# Patient Record
Sex: FEMALE | Race: OTHER | Employment: STUDENT | URBAN - METROPOLITAN AREA
[De-identification: names, ages, dates, MRNs, and addresses within clinical notes are randomized per-mention and may not be internally consistent; named-entity substitution may affect disease eponyms.]

---

## 2021-11-28 ENCOUNTER — HOSPITAL ENCOUNTER (EMERGENCY)
Facility: HOSPITAL | Age: 21
Discharge: NURSING, REHAB OR LONG-TERM CARE | End: 2021-11-29
Attending: EMERGENCY MEDICINE
Payer: MEDICAID

## 2021-11-28 DIAGNOSIS — R45.851 SUICIDAL IDEATION: ICD-10-CM

## 2021-11-28 DIAGNOSIS — F32.A DEPRESSION, UNSPECIFIED DEPRESSION TYPE: Primary | ICD-10-CM

## 2021-11-29 VITALS
HEART RATE: 85 BPM | TEMPERATURE: 99 F | BODY MASS INDEX: 29.99 KG/M2 | DIASTOLIC BLOOD PRESSURE: 80 MMHG | WEIGHT: 180 LBS | RESPIRATION RATE: 18 BRPM | HEIGHT: 65 IN | SYSTOLIC BLOOD PRESSURE: 125 MMHG | OXYGEN SATURATION: 100 %

## 2021-11-29 PROCEDURE — 90792 PSYCH DIAG EVAL W/MED SRVCS: CPT | Performed by: OTHER

## 2021-11-29 RX ORDER — IBUPROFEN 600 MG/1
600 TABLET ORAL EVERY 6 HOURS PRN
Status: DISCONTINUED | OUTPATIENT
Start: 2021-11-29 | End: 2021-11-29

## 2021-11-29 RX ORDER — LORAZEPAM 2 MG/ML
1 INJECTION INTRAMUSCULAR ONCE
Status: COMPLETED | OUTPATIENT
Start: 2021-11-29 | End: 2021-11-29

## 2021-11-29 RX ORDER — DIAZEPAM 5 MG/1
5 TABLET ORAL ONCE
Status: COMPLETED | OUTPATIENT
Start: 2021-11-29 | End: 2021-11-29

## 2021-11-29 RX ORDER — LORAZEPAM 1 MG/1
1 TABLET ORAL ONCE
Status: COMPLETED | OUTPATIENT
Start: 2021-11-29 | End: 2021-11-29

## 2021-11-29 RX ORDER — PAROXETINE 10 MG/1
10 TABLET, FILM COATED ORAL DAILY
Status: DISCONTINUED | OUTPATIENT
Start: 2021-11-29 | End: 2021-11-29

## 2021-11-29 RX ORDER — ZIPRASIDONE HYDROCHLORIDE 20 MG/1
20 CAPSULE ORAL EVERY MORNING
Status: DISCONTINUED | OUTPATIENT
Start: 2021-11-29 | End: 2021-11-29

## 2021-11-29 RX ORDER — FLUOXETINE 10 MG/1
10 TABLET, FILM COATED ORAL DAILY
Status: DISCONTINUED | OUTPATIENT
Start: 2021-11-29 | End: 2021-11-29

## 2021-11-29 RX ORDER — ZIPRASIDONE HYDROCHLORIDE 40 MG/1
40 CAPSULE ORAL EVERY EVENING
Status: DISCONTINUED | OUTPATIENT
Start: 2021-11-29 | End: 2021-11-29

## 2021-11-29 RX ORDER — DIAZEPAM 2 MG/1
2 TABLET ORAL EVERY 6 HOURS PRN
Status: DISCONTINUED | OUTPATIENT
Start: 2021-11-29 | End: 2021-11-29

## 2021-11-29 RX ORDER — LORAZEPAM 2 MG/ML
2 INJECTION INTRAMUSCULAR ONCE
Status: COMPLETED | OUTPATIENT
Start: 2021-11-29 | End: 2021-11-29

## 2021-11-29 NOTE — ED NOTES
Pt accepted to [address] [place] [address] under Dr. [place].  Arrange transport to arrive for pickup from the ED at 8pm

## 2021-11-29 NOTE — ED PROVIDER NOTES
Patient Seen in: [facility] Emergency Department      History   Patient presents with:  Eval-P    Stated Complaint: eval p    Subjective:   HPI    Patient is a 53-year-old female comes emergency room for psychiatric evaluation.   She apparently sent LMP 10/19/2021   SpO2 98%   BMI 29.95 kg/m²         Physical Exam    CONSTITUTIONAL no acute distress, alert and oriented X 3.   HEENT: Extra ocular motions intact, sclerae white, conjunctivae pink, oropharynx unremarkable  NECK: Supple  LUNGS: Clear to au test is intended for the qualitative detection and differentiation of SARS-CoV-2, influenza A, influenza B, and respiratory syncytial virus (RSV) viral RNA in nasopharyngeal swab from individuals suspected of respiratory viral infection consistent with COV Impression:  Depression, unspecified depression type  (primary encounter diagnosis)  Suicidal ideation     Disposition:  There is no disposition on file for this visit. There is no disposition time on file for this visit.     Follow-up:  No follow-up provi

## 2021-11-29 NOTE — ED QUICK NOTES
Pt requesting phone to call her mom to let her know she was in the ER. Given Spectralink; no answer for mom. Pt requested to call sister to let her know and also to have sister call her job.   Pt at first spoke [place], but then began speaking in foreign l

## 2021-11-29 NOTE — ED PROVIDER NOTES
Ativan 1 mg p.o. given. Patient placed at [facility] for transfer at 2000 today.
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
## 2021-11-29 NOTE — PROGRESS NOTES
Can have roommate - No infection safety concerns       11/28/21 1098   COVID Exposure Risk Screening   Have you been practicing social distancing? Yes   Have you been wearing a mask when in the community?  Yes   Are the people you live with following social

## 2021-11-29 NOTE — ED QUICK NOTES
Report received from Verónica Jimenez, [facility]. Pt sleeping on cart respirations full and easy. 1:1 sitter remains at bedside.

## 2021-11-29 NOTE — ED QUICK NOTES
Patient's seclusion discontinued. Sitter at bedside.   Patient's suicide alert upgraded to high alert

## 2021-11-29 NOTE — ED NOTES
Transfer Summary:    No Beds:  [address]    Call Back at 9:30am:  [facility]    Packet Faxed:  [facility]

## 2021-11-29 NOTE — ED NOTES
Patient has remained comfortable and cooperative here in the emergency room. Patient intermittently laughing with significant other at the bedside.   The patient is still awaiting transfer to an inpatient psychiatric facility as per my discussion with
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
## 2021-11-29 NOTE — ED NOTES
Nurse was called to inform her that Matt Larson was accepted to [facility]. Provided Nurse with accepting Doctor, address and when transportation can be arranged. Spoke with Matt Larson to inform her of acceptance to [facility].  Matt Lasron inquired about placeme
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
## 2021-11-30 NOTE — CERTIFICATION
Ref: [facility] 5/3-403, 5/3-602, 5/3-607, 5/3-610    5/3-702, 5/3-813, 5/4-306, 5/4-402, 5/4-403    5/4-405, 5/4-501, 5/4-611, 9/5-290   Inpatient Certificate  Re: Korey Hazel    (name)     I personally informed the above-named individual of the not treated on an inpatient basis, is reasonably expected based on his or her behavioral history, to suffer mental or emotional deterioration and is reasonably expected, after such deterioration, to meet the criteria of either paragraph one or paragraph tw

## 2021-11-30 NOTE — ED NOTES
Telepsych completed with Dr. Khadra Kendrick. Informed Consent For Telemedicine Services and 2nd Cert has been scanned to chart.

## 2023-09-24 ENCOUNTER — HOSPITAL ENCOUNTER (EMERGENCY)
Facility: HOSPITAL | Age: 23
Discharge: LEFT BEFORE DISCHARGE | End: 2023-09-24
Payer: MEDICAID

## 2023-09-24 VITALS
SYSTOLIC BLOOD PRESSURE: 142 MMHG | TEMPERATURE: 97 F | OXYGEN SATURATION: 99 % | DIASTOLIC BLOOD PRESSURE: 63 MMHG | HEART RATE: 89 BPM | RESPIRATION RATE: 27 BRPM

## 2023-09-24 NOTE — ED INITIAL ASSESSMENT (HPI)
A&Ox3 patient bib ems from work for severe abdominal pain and n/v    Patient medicated w/ 4mg IV zofran per ems, endorses no nausea relief    Reports sudden onset of lower abdominal pain  Denies any hx of abdominal surgery  Denies any use of marijuana  Reports regular menstrual cycles    Patient hyperventilating during triage d/t pain

## 2023-11-04 ENCOUNTER — HOSPITAL ENCOUNTER (EMERGENCY)
Facility: HOSPITAL | Age: 23
Discharge: HOME | End: 2023-11-04
Attending: PEDIATRICS
Payer: MEDICAID

## 2023-11-04 ENCOUNTER — TELEPHONE (OUTPATIENT)
Dept: CASE MANAGEMENT | Facility: HOSPITAL | Age: 23
End: 2023-11-04

## 2023-11-04 VITALS
DIASTOLIC BLOOD PRESSURE: 70 MMHG | HEART RATE: 69 BPM | RESPIRATION RATE: 19 BRPM | TEMPERATURE: 99 F | BODY MASS INDEX: 29.99 KG/M2 | HEIGHT: 65 IN | SYSTOLIC BLOOD PRESSURE: 112 MMHG | OXYGEN SATURATION: 98 % | WEIGHT: 180 LBS

## 2023-11-04 DIAGNOSIS — Z20.2 POSSIBLE EXPOSURE TO STD: ICD-10-CM

## 2023-11-04 DIAGNOSIS — R53.81 MALAISE: Primary | ICD-10-CM

## 2023-11-04 LAB
B-HCG UR QL: NEGATIVE
BILIRUB UR QL STRIP.AUTO: NEGATIVE
CLARITY UR REFRACT.AUTO: CLEAR
GLUCOSE UR STRIP.AUTO-MCNC: NORMAL MG/DL
HIV 1+2 AB+HIV1 P24 AG SERPL QL IA: NONREACTIVE
KETONES UR STRIP.AUTO-MCNC: NEGATIVE MG/DL
LEUKOCYTE ESTERASE UR QL STRIP.AUTO: NEGATIVE
NITRITE UR QL STRIP.AUTO: NEGATIVE
PH UR STRIP.AUTO: 6.5 [PH] (ref 5–8)
PROT UR STRIP.AUTO-MCNC: NEGATIVE MG/DL
SP GR UR STRIP.AUTO: 1.01 (ref 1–1.03)
T PALLIDUM AB SER QL IA: NONREACTIVE
UROBILINOGEN UR STRIP.AUTO-MCNC: NORMAL MG/DL

## 2023-11-04 PROCEDURE — 81025 URINE PREGNANCY TEST: CPT

## 2023-11-04 PROCEDURE — 87591 N.GONORRHOEAE DNA AMP PROB: CPT | Performed by: PEDIATRICS

## 2023-11-04 PROCEDURE — 81001 URINALYSIS AUTO W/SCOPE: CPT | Performed by: PEDIATRICS

## 2023-11-04 PROCEDURE — 86701 HIV-1ANTIBODY: CPT | Performed by: PEDIATRICS

## 2023-11-04 PROCEDURE — 86780 TREPONEMA PALLIDUM: CPT | Performed by: PEDIATRICS

## 2023-11-04 PROCEDURE — 99283 EMERGENCY DEPT VISIT LOW MDM: CPT

## 2023-11-04 PROCEDURE — 87491 CHLMYD TRACH DNA AMP PROBE: CPT | Performed by: PEDIATRICS

## 2023-11-04 NOTE — ED INITIAL ASSESSMENT (HPI)
Pt requesting HIV and syphilis testing, pt states having unprotected sex recently. Pt states she had some done with PCP but was referred to ED for further testing.

## 2023-11-05 NOTE — CM/SW NOTE
2056:  Received call from [facility] staff stating patient called stating she was seen in ER today and some of her test results have resulted into my chart, however pt's HIV result has not and patient is wondering why and inquiring what result is. This ERCM advised [facility] staff to contact [facility] regarding the above to assist patient. [facility] staff v/u.    2112: This ERCM then receives call from patient whom states all of the above information. This ERCM inquired if [facility] contacted IS for patient - per patient she spoke with IS and IS advised her that the reason the HIV has not resulted is because it may be too sensitive to result to my chart. Patient requesting ERCM to inform her of HIV result via phone. ERCM informed patient unfortunately due to HIPAA this ERCM is unable to provide her with test results over the phone as we are unable to accurately verify if she is who she says she is. Patient then inquired if she comes into the hospital in person if we can provide her with result. This ERCM informed patient unfortunately we are unable to inform her of results in person as well. ERCM informed patient she will either need to contact her PCP for the result or contact Medical Records Monday 11/6 AM to obtain her test result. Pt v/u on the above. 2116: This ERCM called the [facility] hospital  and obtained [facility] Medical Records PH#[phone] and hours of operation which are [id number]. This ERCM provided patient with [facility] Medical Records PH# and hours of operation Pt v/u.

## 2023-11-06 LAB
C TRACH DNA SPEC QL NAA+PROBE: NEGATIVE
N GONORRHOEA DNA SPEC QL NAA+PROBE: NEGATIVE